# Patient Record
Sex: MALE | URBAN - METROPOLITAN AREA
[De-identification: names, ages, dates, MRNs, and addresses within clinical notes are randomized per-mention and may not be internally consistent; named-entity substitution may affect disease eponyms.]

---

## 2023-05-05 ENCOUNTER — OFFICE VISIT (OUTPATIENT)
Dept: PRIMARY CARE | Facility: CLINIC | Age: 23
End: 2023-05-05
Payer: COMMERCIAL

## 2023-05-05 VITALS
HEIGHT: 64 IN | HEART RATE: 106 BPM | DIASTOLIC BLOOD PRESSURE: 64 MMHG | OXYGEN SATURATION: 98 % | WEIGHT: 154 LBS | TEMPERATURE: 97.7 F | RESPIRATION RATE: 16 BRPM | BODY MASS INDEX: 26.29 KG/M2 | SYSTOLIC BLOOD PRESSURE: 110 MMHG

## 2023-05-05 DIAGNOSIS — R35.0 URINARY FREQUENCY: Primary | ICD-10-CM

## 2023-05-05 DIAGNOSIS — R35.0 FREQUENCY OF URINATION: ICD-10-CM

## 2023-05-05 PROBLEM — F98.8 ADD (ATTENTION DEFICIT DISORDER): Status: ACTIVE | Noted: 2023-05-05

## 2023-05-05 PROBLEM — F41.9 ANXIETY: Status: ACTIVE | Noted: 2023-05-05

## 2023-05-05 LAB
POC APPEARANCE, URINE: CLEAR
POC BILIRUBIN, URINE: NEGATIVE
POC BLOOD, URINE: NEGATIVE
POC COLOR, URINE: YELLOW
POC FINGERSTICK BLOOD GLUCOSE: 124 MG/DL (ref 70–100)
POC GLUCOSE, URINE: NEGATIVE MG/DL
POC KETONES, URINE: NEGATIVE MG/DL
POC LEUKOCYTES, URINE: NEGATIVE
POC NITRITE,URINE: NEGATIVE
POC PH, URINE: 7 PH
POC PROTEIN, URINE: NEGATIVE MG/DL
POC SPECIFIC GRAVITY, URINE: 1.02
POC UROBILINOGEN, URINE: 0.2 EU/DL

## 2023-05-05 PROCEDURE — 87086 URINE CULTURE/COLONY COUNT: CPT

## 2023-05-05 PROCEDURE — 82962 GLUCOSE BLOOD TEST: CPT | Performed by: FAMILY MEDICINE

## 2023-05-05 PROCEDURE — 99213 OFFICE O/P EST LOW 20 MIN: CPT | Performed by: FAMILY MEDICINE

## 2023-05-05 PROCEDURE — 1036F TOBACCO NON-USER: CPT | Performed by: FAMILY MEDICINE

## 2023-05-05 RX ORDER — HYDROXYZINE HYDROCHLORIDE 25 MG/1
25 TABLET, FILM COATED ORAL 3 TIMES DAILY PRN
COMMUNITY

## 2023-05-05 RX ORDER — DEXTROAMPHETAMINE SACCHARATE, AMPHETAMINE ASPARTATE MONOHYDRATE, DEXTROAMPHETAMINE SULFATE AND AMPHETAMINE SULFATE 7.5; 7.5; 7.5; 7.5 MG/1; MG/1; MG/1; MG/1
30 CAPSULE, EXTENDED RELEASE ORAL
COMMUNITY

## 2023-05-05 RX ORDER — DEXTROAMPHETAMINE SACCHARATE, AMPHETAMINE ASPARTATE, DEXTROAMPHETAMINE SULFATE AND AMPHETAMINE SULFATE 5; 5; 5; 5 MG/1; MG/1; MG/1; MG/1
TABLET ORAL
COMMUNITY
Start: 2023-05-03

## 2023-05-05 RX ORDER — BUSPIRONE HYDROCHLORIDE 7.5 MG/1
7.5 TABLET ORAL 2 TIMES DAILY
COMMUNITY

## 2023-05-05 RX ORDER — DEXTROAMPHETAMINE SULFATE, DEXTROAMPHETAMINE SACCHARATE, AMPHETAMINE SULFATE AND AMPHETAMINE ASPARTATE 2.5; 2.5; 2.5; 2.5 MG/1; MG/1; MG/1; MG/1
10 CAPSULE, EXTENDED RELEASE ORAL EVERY MORNING
COMMUNITY
Start: 2023-04-14

## 2023-05-05 ASSESSMENT — ENCOUNTER SYMPTOMS
CARDIOVASCULAR NEGATIVE: 1
OCCASIONAL FEELINGS OF UNSTEADINESS: 0
CHILLS: 0
DEPRESSION: 0
APPETITE CHANGE: 0
FATIGUE: 0
LOSS OF SENSATION IN FEET: 0
ACTIVITY CHANGE: 0
GASTROINTESTINAL NEGATIVE: 1
DYSURIA: 0
DIAPHORESIS: 0
HEMATURIA: 0
FREQUENCY: 1

## 2023-05-05 ASSESSMENT — PATIENT HEALTH QUESTIONNAIRE - PHQ9
2. FEELING DOWN, DEPRESSED OR HOPELESS: NOT AT ALL
1. LITTLE INTEREST OR PLEASURE IN DOING THINGS: NOT AT ALL
SUM OF ALL RESPONSES TO PHQ9 QUESTIONS 1 AND 2: 0

## 2023-05-05 NOTE — ASSESSMENT & PLAN NOTE
Evaluating for urinary frequency.    Urinalysis is being performed.  Culture and sensitivity going to be sent.    Blood sugar also being performed.    We will await the studies.  If any worsening of symptoms or other change please call and let me know.

## 2023-05-05 NOTE — PROGRESS NOTES
"Subjective   Patient ID: Colt Woody is a 22 y.o. male who presents for Urinary Frequency and Urinary Urgency (X1 week).    Urinary Frequency   Associated symptoms include frequency. Pertinent negatives include no chills or hematuria.    patient presents with frequency and urgency of urination.  Some excersising. 2 times a day for 1/2 hour.    Feeling well.  Patient's had no flank pain or discomfort.  He has been continue to work out watching diet closely continue to lose weight.    Patient's had no increased thirst or other change.    Review of Systems   Constitutional:  Negative for activity change, appetite change, chills, diaphoresis and fatigue.   HENT:  Negative for congestion.    Cardiovascular: Negative.    Gastrointestinal: Negative.    Genitourinary:  Positive for frequency. Negative for dysuria and hematuria.   Allergic/Immunologic: Negative for environmental allergies.       Objective   /64   Pulse 106   Temp 36.5 °C (97.7 °F)   Resp 16   Ht 1.626 m (5' 4\")   Wt 69.9 kg (154 lb)   SpO2 98%   BMI 26.43 kg/m²   BSA Body surface area is 1.78 meters squared.      Physical Exam  Constitutional:       Appearance: Normal appearance.   HENT:      Head: Normocephalic.   Cardiovascular:      Rate and Rhythm: Normal rate and regular rhythm.      Pulses: Normal pulses.   Pulmonary:      Effort: Pulmonary effort is normal.   Abdominal:      General: Abdomen is flat.   Genitourinary:     Penis: Normal.       Testes: Normal.   Neurological:      Mental Status: He is alert.       [facility] Encounter on 08/15/2022   Component Date Value Ref Range Status    TSH 08/15/2022 0.94  0.44 - 3.98 mIU/L Final    Comment:  TSH testing is performed using different testing    methodology at [facility] than at other    [facility] hospitals. Direct result comparisons should    only be made within the same method.      Glucose 08/15/2022 90  74 - 99 mg/dL Final    Sodium 08/15/2022 141  136 - 145 mmol/L Final    " Potassium 08/15/2022 4.3  3.5 - 5.3 mmol/L Final    Chloride 08/15/2022 103  98 - 107 mmol/L Final    Bicarbonate 08/15/2022 29  21 - 32 mmol/L Final    Anion Gap 08/15/2022 13  10 - 20 mmol/L Final    Urea Nitrogen 08/15/2022 14  6 - 23 mg/dL Final    Creatinine 08/15/2022 0.97  0.50 - 1.30 mg/dL Final    GFR MALE 08/15/2022 >90  >90 mL/min/1.73m2 Final    Comment:  CALCULATIONS OF ESTIMATED GFR ARE PERFORMED   USING THE 2021 CKD-EPI STUDY REFIT EQUATION   WITHOUT THE RACE VARIABLE FOR THE IDMS-TRACEABLE   CREATININE METHODS.    https://jasn.asnjournals.org/content/early/2021/09/22/ASN.8611267283      Calcium 08/15/2022 10.4  8.6 - 10.6 mg/dL Final    Albumin 08/15/2022 5.0  3.4 - 5.0 g/dL Final    Alkaline Phosphatase 08/15/2022 83  33 - 120 U/L Final    Total Protein 08/15/2022 7.1  6.4 - 8.2 g/dL Final    AST 08/15/2022 20  9 - 39 U/L Final    Total Bilirubin 08/15/2022 0.7  0.0 - 1.2 mg/dL Final    ALT (SGPT) 08/15/2022 25  10 - 52 U/L Final    Comment:  Patients treated with Sulfasalazine may generate    falsely decreased results for ALT.      WBC 08/15/2022 8.6  4.4 - 11.3 x10E9/L Final    nRBC 08/15/2022 0.0  0.0 - 0.0 /100 WBC Final    RBC 08/15/2022 5.58  4.50 - 5.90 x10E12/L Final    Hemoglobin 08/15/2022 16.1  13.5 - 17.5 g/dL Final    Hematocrit 08/15/2022 48.6  41.0 - 52.0 % Final    MCV 08/15/2022 87  80 - 100 fL Final    MCHC 08/15/2022 33.1  32.0 - 36.0 g/dL Final    Platelets 08/15/2022 310  150 - 450 x10E9/L Final    RDW 08/15/2022 12.9  11.5 - 14.5 % Final    Neutrophils % 08/15/2022 59.1  40.0 - 80.0 % Final    Immature Granulocytes %, Automated 08/15/2022 0.4  0.0 - 0.9 % Final    Comment:  Immature Granulocyte Count (IG) includes promyelocytes,    myelocytes and metamyelocytes but does not include bands.   Percent differential counts (%) should be interpreted in the   context of the absolute cell counts (cells/L).      Lymphocytes % 08/15/2022 30.9  13.0 - 44.0 % Final    Monocytes %  08/15/2022 8.4  2.0 - 10.0 % Final    Eosinophils % 08/15/2022 0.7  0.0 - 6.0 % Final    Basophils % 08/15/2022 0.5  0.0 - 2.0 % Final    Neutrophils Absolute 08/15/2022 5.06  1.20 - 7.70 x10E9/L Final    Lymphocytes Absolute 08/15/2022 2.64  1.20 - 4.80 x10E9/L Final    Monocytes Absolute 08/15/2022 0.72  0.10 - 1.00 x10E9/L Final    Eosinophils Absolute 08/15/2022 0.06  0.00 - 0.70 x10E9/L Final    Basophils Absolute 08/15/2022 0.04  0.00 - 0.10 x10E9/L Final     Current Outpatient Medications on File Prior to Visit   Medication Sig Dispense Refill    Adderall XR 10 mg 24 hr capsule Take 1 capsule (10 mg) by mouth once daily in the morning.      amphetamine-dextroamphetamine (Adderall) 20 mg tablet TAKE 1 TABLET BY MOUTH EVERY DAY IN THE AFTERNOON      amphetamine-dextroamphetamine XR (Adderall XR) 30 mg 24 hr capsule Take 1 capsule (30 mg) by mouth once daily in the morning. Take before meals.      busPIRone (Buspar) 7.5 mg tablet Take 1 tablet (7.5 mg) by mouth 2 times a day.      hydrOXYzine HCL (Atarax) 25 mg tablet Take 1 tablet (25 mg) by mouth 3 times a day as needed for anxiety.       No current facility-administered medications on file prior to visit.     No images are attached to the encounter.            Assessment/Plan   Problem List Items Addressed This Visit          Other    Urinary frequency - Primary

## 2023-05-05 NOTE — PATIENT INSTRUCTIONS
Evaluating for urinary frequency.  Urinalysis was performed and appears unremarkable I am sending urine for culture and sensitivity.    We are checking blood sugar today as well to make sure that is not an issue.  Going to asked that we follow the symptoms and asked that he keep a diary of how often you are going to the bathroom.  If any troubles with back pain or other changes noted, please let me know.

## 2023-05-07 LAB — URINE CULTURE: NORMAL

## 2024-12-12 ENCOUNTER — OFFICE VISIT (OUTPATIENT)
Dept: PRIMARY CARE | Facility: CLINIC | Age: 24
End: 2024-12-12
Payer: COMMERCIAL

## 2024-12-12 VITALS
HEART RATE: 83 BPM | HEIGHT: 64 IN | TEMPERATURE: 97.6 F | SYSTOLIC BLOOD PRESSURE: 108 MMHG | BODY MASS INDEX: 27.31 KG/M2 | OXYGEN SATURATION: 96 % | WEIGHT: 160 LBS | DIASTOLIC BLOOD PRESSURE: 68 MMHG

## 2024-12-12 DIAGNOSIS — J06.9 UPPER RESPIRATORY TRACT INFECTION, UNSPECIFIED TYPE: Primary | ICD-10-CM

## 2024-12-12 PROCEDURE — 3008F BODY MASS INDEX DOCD: CPT | Performed by: FAMILY MEDICINE

## 2024-12-12 PROCEDURE — 87637 SARSCOV2&INF A&B&RSV AMP PRB: CPT

## 2024-12-12 PROCEDURE — 99213 OFFICE O/P EST LOW 20 MIN: CPT | Performed by: FAMILY MEDICINE

## 2024-12-12 RX ORDER — AMOXICILLIN 875 MG/1
875 TABLET, FILM COATED ORAL 2 TIMES DAILY
Qty: 20 TABLET | Refills: 0 | Status: SHIPPED | OUTPATIENT
Start: 2024-12-12 | End: 2024-12-22

## 2024-12-12 RX ORDER — DEXTROAMPHETAMINE SACCHARATE, AMPHETAMINE ASPARTATE MONOHYDRATE, DEXTROAMPHETAMINE SULFATE AND AMPHETAMINE SULFATE 5; 5; 5; 5 MG/1; MG/1; MG/1; MG/1
1 CAPSULE, EXTENDED RELEASE ORAL
COMMUNITY
Start: 2024-09-24

## 2024-12-12 RX ORDER — HYDROXYZINE PAMOATE 25 MG/1
1 CAPSULE ORAL
COMMUNITY
Start: 2024-10-19

## 2024-12-12 RX ORDER — VILAZODONE HYDROCHLORIDE 40 MG/1
40 TABLET ORAL EVERY EVENING
COMMUNITY
Start: 2024-11-19

## 2024-12-12 ASSESSMENT — PATIENT HEALTH QUESTIONNAIRE - PHQ9
SUM OF ALL RESPONSES TO PHQ QUESTIONS 1-9: 18
10. IF YOU CHECKED OFF ANY PROBLEMS, HOW DIFFICULT HAVE THESE PROBLEMS MADE IT FOR YOU TO DO YOUR WORK, TAKE CARE OF THINGS AT HOME, OR GET ALONG WITH OTHER PEOPLE: VERY DIFFICULT
9. THOUGHTS THAT YOU WOULD BE BETTER OFF DEAD, OR OF HURTING YOURSELF: NOT AT ALL
8. MOVING OR SPEAKING SO SLOWLY THAT OTHER PEOPLE COULD HAVE NOTICED. OR THE OPPOSITE, BEING SO FIGETY OR RESTLESS THAT YOU HAVE BEEN MOVING AROUND A LOT MORE THAN USUAL: NOT AT ALL
5. POOR APPETITE OR OVEREATING: MORE THAN HALF THE DAYS
7. TROUBLE CONCENTRATING ON THINGS, SUCH AS READING THE NEWSPAPER OR WATCHING TELEVISION: NEARLY EVERY DAY
2. FEELING DOWN, DEPRESSED OR HOPELESS: NEARLY EVERY DAY
4. FEELING TIRED OR HAVING LITTLE ENERGY: NEARLY EVERY DAY
1. LITTLE INTEREST OR PLEASURE IN DOING THINGS: NEARLY EVERY DAY
3. TROUBLE FALLING OR STAYING ASLEEP OR SLEEPING TOO MUCH: SEVERAL DAYS
SUM OF ALL RESPONSES TO PHQ9 QUESTIONS 1 AND 2: 6
6. FEELING BAD ABOUT YOURSELF - OR THAT YOU ARE A FAILURE OR HAVE LET YOURSELF OR YOUR FAMILY DOWN: NEARLY EVERY DAY

## 2024-12-12 ASSESSMENT — ENCOUNTER SYMPTOMS
LOSS OF SENSATION IN FEET: 0
ACTIVITY CHANGE: 0
WHEEZING: 0
CHEST TIGHTNESS: 0
EYE ITCHING: 0
COUGH: 1
CONSTIPATION: 0
DIAPHORESIS: 0
ANAL BLEEDING: 0
SORE THROAT: 0
FEVER: 0
OCCASIONAL FEELINGS OF UNSTEADINESS: 0
DEPRESSION: 1

## 2024-12-12 NOTE — PATIENT INSTRUCTIONS
Viral cultures have been performed.    I should have these reports back tomorrow.    If any worsening symptoms such as high fever, shaking chills, inability to eat or drink fluids please call and let me know.    If troubles with nausea vomiting or other change please let me know    Findings were discussed with patient and father.        Starting antibiotic therapy.

## 2024-12-12 NOTE — PROGRESS NOTES
"Subjective   Patient ID: Colt Woody is a 24 y.o. male who presents for Cough (Nasal congestion ).    4 days ago.  Has had nasal drainage congestion for last 4 days.  Started to get a little bit into the sinuses started with a sore throat which is now better.  No high fever or shaking chills little cough.            Cough  Associated symptoms include ear pain and postnasal drip. Pertinent negatives include no fever, sore throat or wheezing.    patient presents for cough nasal drainage congestion    Review of Systems   Constitutional:  Negative for activity change, diaphoresis and fever.   HENT:  Positive for congestion, ear pain and postnasal drip. Negative for mouth sores and sore throat.    Eyes:  Negative for itching.   Respiratory:  Positive for cough. Negative for chest tightness and wheezing.    Cardiovascular:  Negative for leg swelling.   Gastrointestinal:  Negative for anal bleeding and constipation.   Endocrine: Negative for heat intolerance.       Objective   /68   Pulse 83   Temp 36.4 °C (97.6 °F)   Ht 1.626 m (5' 4\")   Wt 72.6 kg (160 lb)   SpO2 96%   BMI 27.46 kg/m²   BSA Body surface area is 1.81 meters squared.      Physical Exam  Constitutional:       Appearance: Normal appearance.   HENT:      Head: Normocephalic.   Cardiovascular:      Rate and Rhythm: Normal rate and regular rhythm.   Musculoskeletal:      Cervical back: Normal range of motion.   Neurological:      Mental Status: He is alert.     Some pressure in the maxillary sinuses.  Some purulent nasal drainage.    Throat revealed no significant posterior pharyngeal injection.  Heart is of regular rhythm lungs clear  No visits with results within 1 Year(s) from this visit.   Latest known visit with results is:   Office Visit on 05/05/2023   Component Date Value Ref Range Status    Urine Culture 05/05/2023 Culture Comments - See Below   Final    POC Fingerstick Blood Glucose 05/05/2023 124 (A)  70 - 100 mg/dl Final    POC Color, " Urine 05/05/2023 Yellow  Straw, Yellow, Light Yellow In process    POC Appearance, Urine 05/05/2023 Clear  Clear In process    POC Specific Gravity, Urine 05/05/2023 1.020  1.005 - 1.035 In process    POC PH, Urine 05/05/2023 7.0  No Reference Range Established PH In process    POC Protein, Urine 05/05/2023 NEGATIVE  NEGATIVE, 30 (1+) mg/dl In process    POC Glucose, Urine 05/05/2023 NEGATIVE  NEGATIVE mg/dl In process    POC Blood, Urine 05/05/2023 NEGATIVE  NEGATIVE In process    POC Ketones, Urine 05/05/2023 NEGATIVE  NEGATIVE mg/dl In process    POC Bilirubin, Urine 05/05/2023 NEGATIVE  NEGATIVE In process    POC Urobilinogen, Urine 05/05/2023 0.2  0.2, 1.0 EU/DL In process    Poc Nitrite, Urine 05/05/2023 NEGATIVE  NEGATIVE In process    POC Leukocytes, Urine 05/05/2023 NEGATIVE  NEGATIVE In process     Current Outpatient Medications on File Prior to Visit   Medication Sig Dispense Refill    Adderall XR 10 mg 24 hr capsule Take 1 capsule (10 mg) by mouth once daily in the morning.      amphetamine-dextroamphetamine XR (Adderall XR) 20 mg 24 hr capsule Take 1 capsule (20 mg) by mouth early in the morning..      amphetamine-dextroamphetamine XR (Adderall XR) 30 mg 24 hr capsule Take 1 capsule (30 mg) by mouth once daily in the morning. Take before meals.      busPIRone (Buspar) 7.5 mg tablet Take 1 tablet (7.5 mg) by mouth 2 times a day.      hydrOXYzine pamoate (Vistaril) 25 mg capsule Take 1 capsule (25 mg) by mouth every 12 hours.      vilazodone (Viibryd) 40 mg tablet Take 1 tablet (40 mg) by mouth once daily in the evening.      [DISCONTINUED] amphetamine-dextroamphetamine (Adderall) 20 mg tablet TAKE 1 TABLET BY MOUTH EVERY DAY IN THE AFTERNOON      [DISCONTINUED] hydrOXYzine HCL (Atarax) 25 mg tablet Take 1 tablet (25 mg) by mouth 3 times a day as needed for anxiety.       No current facility-administered medications on file prior to visit.     No images are attached to the encounter.             Assessment/Plan   Problem List Items Addressed This Visit    None  Visit Diagnoses         Codes    Upper respiratory tract infection, unspecified type    -  Primary J06.9    Relevant Medications    amoxicillin (Amoxil) 875 mg tablet

## 2024-12-13 LAB
FLUAV RNA RESP QL NAA+PROBE: NOT DETECTED
FLUBV RNA RESP QL NAA+PROBE: NOT DETECTED
RSV RNA RESP QL NAA+PROBE: NOT DETECTED
SARS-COV-2 ORF1AB RESP QL NAA+PROBE: NOT DETECTED